# Patient Record
Sex: MALE | Race: WHITE | NOT HISPANIC OR LATINO | URBAN - METROPOLITAN AREA
[De-identification: names, ages, dates, MRNs, and addresses within clinical notes are randomized per-mention and may not be internally consistent; named-entity substitution may affect disease eponyms.]

---

## 2017-11-03 ENCOUNTER — OUTPATIENT (OUTPATIENT)
Dept: OUTPATIENT SERVICES | Facility: HOSPITAL | Age: 72
LOS: 1 days | Discharge: HOME | End: 2017-11-03

## 2017-11-03 DIAGNOSIS — E11.9 TYPE 2 DIABETES MELLITUS WITHOUT COMPLICATIONS: ICD-10-CM

## 2017-11-03 DIAGNOSIS — E55.9 VITAMIN D DEFICIENCY, UNSPECIFIED: ICD-10-CM

## 2017-11-03 DIAGNOSIS — D64.9 ANEMIA, UNSPECIFIED: ICD-10-CM

## 2017-11-03 DIAGNOSIS — N39.0 URINARY TRACT INFECTION, SITE NOT SPECIFIED: ICD-10-CM

## 2017-11-03 DIAGNOSIS — N18.2 CHRONIC KIDNEY DISEASE, STAGE 2 (MILD): ICD-10-CM

## 2017-11-03 DIAGNOSIS — E78.00 PURE HYPERCHOLESTEROLEMIA, UNSPECIFIED: ICD-10-CM

## 2018-06-19 ENCOUNTER — OUTPATIENT (OUTPATIENT)
Dept: OUTPATIENT SERVICES | Facility: HOSPITAL | Age: 73
LOS: 1 days | Discharge: HOME | End: 2018-06-19

## 2018-06-19 DIAGNOSIS — N39.0 URINARY TRACT INFECTION, SITE NOT SPECIFIED: ICD-10-CM

## 2018-06-20 ENCOUNTER — OUTPATIENT (OUTPATIENT)
Dept: OUTPATIENT SERVICES | Facility: HOSPITAL | Age: 73
LOS: 1 days | Discharge: HOME | End: 2018-06-20

## 2018-06-20 DIAGNOSIS — E11.9 TYPE 2 DIABETES MELLITUS WITHOUT COMPLICATIONS: ICD-10-CM

## 2018-06-20 DIAGNOSIS — D64.9 ANEMIA, UNSPECIFIED: ICD-10-CM

## 2018-06-20 DIAGNOSIS — N40.0 BENIGN PROSTATIC HYPERPLASIA WITHOUT LOWER URINARY TRACT SYMPTOMS: ICD-10-CM

## 2018-06-20 DIAGNOSIS — N18.2 CHRONIC KIDNEY DISEASE, STAGE 2 (MILD): ICD-10-CM

## 2019-01-11 ENCOUNTER — OUTPATIENT (OUTPATIENT)
Dept: OUTPATIENT SERVICES | Facility: HOSPITAL | Age: 74
LOS: 1 days | Discharge: HOME | End: 2019-01-11

## 2019-01-11 ENCOUNTER — TRANSCRIPTION ENCOUNTER (OUTPATIENT)
Age: 74
End: 2019-01-11

## 2019-01-11 ENCOUNTER — LABORATORY RESULT (OUTPATIENT)
Age: 74
End: 2019-01-11

## 2019-01-11 ENCOUNTER — APPOINTMENT (OUTPATIENT)
Dept: PLASTIC SURGERY | Facility: CLINIC | Age: 74
End: 2019-01-11
Payer: MEDICARE

## 2019-01-11 DIAGNOSIS — Z86.79 PERSONAL HISTORY OF OTHER DISEASES OF THE CIRCULATORY SYSTEM: ICD-10-CM

## 2019-01-11 DIAGNOSIS — I82.401 ACUTE EMBOLISM AND THROMBOSIS OF UNSPECIFIED DEEP VEINS OF RIGHT LOWER EXTREMITY: ICD-10-CM

## 2019-01-11 DIAGNOSIS — L98.9 DISORDER OF THE SKIN AND SUBCUTANEOUS TISSUE, UNSPECIFIED: ICD-10-CM

## 2019-01-11 DIAGNOSIS — Z86.39 PERSONAL HISTORY OF OTHER ENDOCRINE, NUTRITIONAL AND METABOLIC DISEASE: ICD-10-CM

## 2019-01-11 DIAGNOSIS — Z87.891 PERSONAL HISTORY OF NICOTINE DEPENDENCE: ICD-10-CM

## 2019-01-11 PROCEDURE — 11102 TANGNTL BX SKIN SINGLE LES: CPT

## 2019-01-11 RX ORDER — ATORVASTATIN CALCIUM 40 MG/1
40 TABLET, FILM COATED ORAL
Refills: 0 | Status: ACTIVE | COMMUNITY

## 2019-01-11 RX ORDER — LOSARTAN POTASSIUM 100 MG/1
TABLET, FILM COATED ORAL
Refills: 0 | Status: ACTIVE | COMMUNITY

## 2019-01-14 DIAGNOSIS — L98.9 DISORDER OF THE SKIN AND SUBCUTANEOUS TISSUE, UNSPECIFIED: ICD-10-CM

## 2019-01-24 ENCOUNTER — APPOINTMENT (OUTPATIENT)
Dept: PLASTIC SURGERY | Facility: CLINIC | Age: 74
End: 2019-01-24
Payer: MEDICARE

## 2019-01-24 PROCEDURE — 99213 OFFICE O/P EST LOW 20 MIN: CPT

## 2019-01-24 NOTE — HISTORY OF PRESENT ILLNESS
[FreeTextEntry1] : 74 yo M with PMH of HTN, HLD, DVT in the past treated with Coumadin who presents with left ear pigmented lesion. Patient states the lesion has been present for few years but it is increasing in size and changing in pigmentation. Denies any personal or family history of skin cancer. \par \par Interval history (1/24/19). Patient presents today for follow up 2 weeks s/p excision of left ear conchal bawl skin lesion. Denies any significant pain, fever, chills or bleeding. Applying daily Aquaphor.\par

## 2019-01-24 NOTE — HISTORY OF PRESENT ILLNESS
[FreeTextEntry1] : 74 yo M with PMH of HTN, HLD, DVT in the past treated with Coumadin who presents with left ear pigmented lesion. Patient states the lesion has been present for few years but it is increasing in size and changing in pigmentation. Denies any personal or family history of skin cancer. \par \par Former smoker

## 2019-01-24 NOTE — PHYSICAL EXAM
[de-identified] : well-developed male, no apparent distress [de-identified] : NC/AT [de-identified] : PERRL [de-identified] : left ear with darkly pigmented skin lesion over conchal bawl measuring 5 x 6mm, heterogenous pigmentation in the center, irregular borders  [de-identified] : MARITZAR [de-identified] : soft, non-tender

## 2019-01-24 NOTE — ASSESSMENT
[FreeTextEntry1] : 72 yo M with left ear pigmented skin lesion r/o melanocytic nevus. \par \par - recommend shave biopsy to establish diagnosis

## 2019-01-24 NOTE — ASSESSMENT
[FreeTextEntry1] : 72 yo M 2 weeks s/p excision of left ear skin lesion. 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 \par \par - continue daily Aquaphor\par - pathology discussed with patient- will need re-excision for margins\par - schedule office procedure\par - recommend dermatology consultation for whole body evaluation \par - f/u for office procedure in 4-6 weeks. \par \par Patient was seen and examined with Dr. Aguillon.

## 2019-02-21 ENCOUNTER — OUTPATIENT (OUTPATIENT)
Dept: OUTPATIENT SERVICES | Facility: HOSPITAL | Age: 74
LOS: 1 days | Discharge: HOME | End: 2019-02-21

## 2019-02-21 ENCOUNTER — APPOINTMENT (OUTPATIENT)
Dept: PLASTIC SURGERY | Facility: CLINIC | Age: 74
End: 2019-02-21
Payer: MEDICARE

## 2019-02-21 ENCOUNTER — LABORATORY RESULT (OUTPATIENT)
Age: 74
End: 2019-02-21

## 2019-02-21 PROCEDURE — 11442 EXC FACE-MM B9+MARG 1.1-2 CM: CPT

## 2019-02-21 PROCEDURE — 13151 CMPLX RPR E/N/E/L 1.1-2.5 CM: CPT | Mod: 59

## 2019-02-21 NOTE — PROCEDURE
[FreeTextEntry6] : Patient is a 73 year old male with a left ear lesion measuring approximately 1.5 x 1.5  cm.  \par \par The area was prepped and draped in the usual fashion.  Local anesthetic was administered using 1% lidocaine with epinephrine.\par \par Lesion was sharply excised.  Area was irrigated copiously.  Complex wound closure was performed in layers.  The wound measured approximately 2.5  cm.\par \par Sterile dressing applied.  \par \par Patient tolerated procedure well and understands post-op instructions.\par \par Sutures Used:  4-0 nylon\par \par \par \par \par \par

## 2019-02-22 DIAGNOSIS — D03.9 MELANOMA IN SITU, UNSPECIFIED: ICD-10-CM

## 2019-02-25 ENCOUNTER — APPOINTMENT (OUTPATIENT)
Dept: PLASTIC SURGERY | Facility: CLINIC | Age: 74
End: 2019-02-25
Payer: MEDICARE

## 2019-02-25 PROCEDURE — 99212 OFFICE O/P EST SF 10 MIN: CPT

## 2019-02-25 NOTE — PHYSICAL EXAM
[de-identified] : well-appearing male, NAD [de-identified] : left ear conchal bawl with an open wound, punctate bleeding noted, clean, no evidence of cellulitis

## 2019-02-25 NOTE — HISTORY OF PRESENT ILLNESS
[FreeTextEntry1] : 72 yo M with PMH of HTN, HLD, DVT in the past treated with Coumadin who presents with left ear pigmented lesion. Patient states the lesion has been present for few years but it is increasing in size and changing in pigmentation. Denies any personal or family history of skin cancer. \par \par Interval history (1/24/19). Patient presents today for follow up 2 weeks s/p excision of left ear conchal bawl skin lesion. Denies any significant pain, fever, chills or bleeding. Applying daily Aquaphor.\par \par Interval history (2/25/19). Patient presents POD#4 s/p re-excision of left ear melanoma in-situ c/o slight incisional discomfort and oozing from the site 1-2 days after procedure, now resolved. Denies any fever or chills.

## 2019-02-25 NOTE — ASSESSMENT
[FreeTextEntry1] : 74 yo M POD#4 s/p re-excision of left ear melanoma in-situ. Pathology pending. \par \par - Xeroform bolster dressing removed\par - Xeroform/Bacitracin dressing applied to stay in place x 3 days then daily Bacitracin \par - awaiting pathology \par - f/u 2 weeks

## 2019-02-28 ENCOUNTER — OUTPATIENT (OUTPATIENT)
Dept: OUTPATIENT SERVICES | Facility: HOSPITAL | Age: 74
LOS: 1 days | Discharge: HOME | End: 2019-02-28

## 2019-02-28 DIAGNOSIS — E11.9 TYPE 2 DIABETES MELLITUS WITHOUT COMPLICATIONS: ICD-10-CM

## 2019-02-28 DIAGNOSIS — N18.2 CHRONIC KIDNEY DISEASE, STAGE 2 (MILD): ICD-10-CM

## 2019-02-28 DIAGNOSIS — E78.00 PURE HYPERCHOLESTEROLEMIA, UNSPECIFIED: ICD-10-CM

## 2019-02-28 DIAGNOSIS — D64.9 ANEMIA, UNSPECIFIED: ICD-10-CM

## 2019-02-28 DIAGNOSIS — N39.0 URINARY TRACT INFECTION, SITE NOT SPECIFIED: ICD-10-CM

## 2019-02-28 DIAGNOSIS — E55.9 VITAMIN D DEFICIENCY, UNSPECIFIED: ICD-10-CM

## 2019-02-28 DIAGNOSIS — K76.89 OTHER SPECIFIED DISEASES OF LIVER: ICD-10-CM

## 2019-03-04 ENCOUNTER — TRANSCRIPTION ENCOUNTER (OUTPATIENT)
Age: 74
End: 2019-03-04

## 2019-03-07 ENCOUNTER — APPOINTMENT (OUTPATIENT)
Dept: PLASTIC SURGERY | Facility: CLINIC | Age: 74
End: 2019-03-07
Payer: MEDICARE

## 2019-03-07 DIAGNOSIS — D03.9 MELANOMA IN SITU, UNSPECIFIED: ICD-10-CM

## 2019-03-07 PROCEDURE — 99212 OFFICE O/P EST SF 10 MIN: CPT

## 2019-03-07 NOTE — HISTORY OF PRESENT ILLNESS
[FreeTextEntry1] : 74 yo M with PMH of HTN, HLD, DVT in the past treated with Coumadin who presents with left ear pigmented lesion. Patient states the lesion has been present for few years but it is increasing in size and changing in pigmentation. Denies any personal or family history of skin cancer. \par \par Interval history (1/24/19). Patient presents today for follow up 2 weeks s/p excision of left ear conchal bawl skin lesion. Denies any significant pain, fever, chills or bleeding. Applying daily Aquaphor.\par \par Interval history (2/25/19). Patient presents POD#4 s/p re-excision of left ear melanoma in-situ c/o slight incisional discomfort and oozing from the site 1-2 days after procedure, now resolved. Denies any fever or chills. \par \par Interval history (3/7/19). Patient presents today 2 weeks s/p re-excision of left ear melanoma in-situ. Doing well and reporting no significant pain, bleeding, fever or chills. Applying daily Bacitracin. Saw Dr. Sanchez - no other suspicious skin lesions found.

## 2019-03-07 NOTE — PHYSICAL EXAM
[de-identified] : well-appearing male, NAD [de-identified] : left ear open wound over conchal bawl healing nicely, clean, no active bleeding, wound juan c, no evidence of cellulitis

## 2019-03-07 NOTE — DATA REVIEWED
[FreeTextEntry1] :  Biopsy             Final\par \par   TRAMONTANA, FILEMON                 2\par \par \par               Surgical Final Report\par \par \par           Final Diagnosis\par           Left ear lesion:\par           - Ulcer with scar and focal chronic inflammation, consistent with\par           prior biopsy site.\par           - No evidence of residual melanoma is identified. See comment.\par \par           Verified by: Meghna Vasques MD\par           (Electronic Signature)\par           Reported on: 02/27/19 11:44 EST, [facility] [address] [address], [address],\[place]           [place] [address]\par           Histology technical processing performed at [address],\[place]           [place] [address]\par           _________________________________________________________________\par \par           Comment\par           Immunostains for MART1 and HMB45 were performed on sections from\par           block 1C and 1D and show no evidence of residual melanoma\par           maligna.\par \par           Clinical History\par           Re-excision left ear lesion stitch marks 12 oclock\par \par           Specimen(s) Submitted\par           Left ear lesion\par \par           Gross Description\par           The specimen is received in formalin, and labeled "re-excision\par           left ear lesion stitch marks 12 oclock" and consists of an\par           oriented round skin with a black suture marking the 12oclock\par           position, measuring 1.6 x 1.5 x 0.5 cm. There is a round ulcer on\par           the skin measuring 0.3 x 0.3 cm and is 0.7 cm from 12 oclock,\par           0.5 cm from 6 oclock, 0.7 cm from 3 oclock and 0.6 cm from 9\par           oclock. The 12-6 peripheral and deep margins are inked blue. The\par           6-12 peripheral and deep margins are inked black. The tips are\par           [id number] and serially sectioned. Serial sectioning of the\par           specimen reveals white firm surface. The specimen is submitted\par           entirely.\par \par           Summary of Sections:\par           1A -12 oclock tip serial sections -1\par           1B - 6 oclock tip serial sctions -1\par           1C-1D - Serial sections from 12-6 oclock -2\par \par           Total blocks - 4\par \par           Specimen was received and underwent gross examination at [facility], [address] [place] [address],\par           [place] [address].\par \par \par \par \par \par           TRAMONTANA, FILEMON                 2\par \par \par \par                  Surgical Final Report\par \par \par \par \par \par           02/22/19 13:47 aa\par \par           Perioperative Diagnosis\par           D03.9-Melanoma in situ, unspecified\par \par  \par \par  Ordered by: HOA HERNADEZ IV       Collected/Examined: [id number] 11:16AM       \par Verification Required       Stage: Final       \par  Performed at: [facility] (Med Director: Jake Abbott M.D)       Resulted: [id number] 11:44AM       Last Updated: [id number] 01:37PM       Accession: [id number]

## 2019-03-07 NOTE — ASSESSMENT
[FreeTextEntry1] : 74 yo M 2 weeks s/p re-excision of left ear melanoma in-situ. Pathology reveals no residual neoplasm. Doing well. \par \par - continue daily Bacitracin x 3 days then daily Aquaphor\par - pathology discussed with patient and his wife\par - patient reassured\par - f/u 6 months with Dr. Aguillon

## 2019-09-10 ENCOUNTER — APPOINTMENT (OUTPATIENT)
Dept: PLASTIC SURGERY | Facility: CLINIC | Age: 74
End: 2019-09-10

## 2020-08-25 ENCOUNTER — APPOINTMENT (OUTPATIENT)
Dept: UROLOGY | Facility: CLINIC | Age: 75
End: 2020-08-25
Payer: MEDICARE

## 2020-08-25 ENCOUNTER — RX RENEWAL (OUTPATIENT)
Age: 75
End: 2020-08-25

## 2020-08-25 PROCEDURE — 99204 OFFICE O/P NEW MOD 45 MIN: CPT

## 2020-08-25 PROCEDURE — 51798 US URINE CAPACITY MEASURE: CPT

## 2020-09-11 ENCOUNTER — OUTPATIENT (OUTPATIENT)
Dept: OUTPATIENT SERVICES | Facility: HOSPITAL | Age: 75
LOS: 1 days | Discharge: HOME | End: 2020-09-11
Payer: MEDICARE

## 2020-09-11 DIAGNOSIS — N40.1 BENIGN PROSTATIC HYPERPLASIA WITH LOWER URINARY TRACT SYMPTOMS: ICD-10-CM

## 2020-09-11 PROCEDURE — 76770 US EXAM ABDO BACK WALL COMP: CPT | Mod: 26

## 2020-10-07 ENCOUNTER — APPOINTMENT (OUTPATIENT)
Dept: UROLOGY | Facility: CLINIC | Age: 75
End: 2020-10-07
Payer: MEDICARE

## 2020-10-07 VITALS — HEIGHT: 69 IN | WEIGHT: 174 LBS | BODY MASS INDEX: 25.77 KG/M2

## 2020-10-07 VITALS — TEMPERATURE: 97.4 F

## 2020-10-07 PROCEDURE — 99213 OFFICE O/P EST LOW 20 MIN: CPT

## 2020-10-07 PROCEDURE — 51798 US URINE CAPACITY MEASURE: CPT

## 2020-10-14 ENCOUNTER — APPOINTMENT (OUTPATIENT)
Dept: OPHTHALMOLOGY | Facility: CLINIC | Age: 75
End: 2020-10-14
Payer: MEDICARE

## 2020-10-14 ENCOUNTER — NON-APPOINTMENT (OUTPATIENT)
Age: 75
End: 2020-10-14

## 2020-10-14 PROCEDURE — 92250 FUNDUS PHOTOGRAPHY W/I&R: CPT

## 2020-10-14 PROCEDURE — 92004 COMPRE OPH EXAM NEW PT 1/>: CPT

## 2020-11-23 ENCOUNTER — RESULT REVIEW (OUTPATIENT)
Age: 75
End: 2020-11-23

## 2020-11-23 ENCOUNTER — OUTPATIENT (OUTPATIENT)
Dept: OUTPATIENT SERVICES | Facility: HOSPITAL | Age: 75
LOS: 1 days | Discharge: HOME | End: 2020-11-23
Payer: MEDICARE

## 2020-11-23 DIAGNOSIS — N40.1 BENIGN PROSTATIC HYPERPLASIA WITH LOWER URINARY TRACT SYMPTOMS: ICD-10-CM

## 2020-11-23 PROCEDURE — 74178 CT ABD&PLV WO CNTR FLWD CNTR: CPT | Mod: 26

## 2021-02-16 ENCOUNTER — RX RENEWAL (OUTPATIENT)
Age: 76
End: 2021-02-16

## 2021-05-07 ENCOUNTER — APPOINTMENT (OUTPATIENT)
Dept: UROLOGY | Facility: CLINIC | Age: 76
End: 2021-05-07
Payer: MEDICARE

## 2021-05-07 PROCEDURE — 99447 NTRPROF PH1/NTRNET/EHR 11-20: CPT

## 2021-05-07 NOTE — PHYSICAL EXAM
[General Appearance - Well Developed] : well developed [General Appearance - Well Nourished] : well nourished [Normal Appearance] : normal appearance [Well Groomed] : well groomed [General Appearance - In No Acute Distress] : no acute distress [Abdomen Soft] : soft [Costovertebral Angle Tenderness] : no ~M costovertebral angle tenderness [Abdomen Tenderness] : non-tender [Urethral Meatus] : meatus normal [Penis Abnormality] : normal uncircumcised penis [Urinary Bladder Findings] : the bladder was normal on palpation [Scrotum] : the scrotum was normal [Testes Tenderness] : no tenderness of the testes [Prostate Tenderness] : the prostate was not tender [Testes Mass (___cm)] : there were no testicular masses [No Prostate Nodules] : no prostate nodules [Prostate Size ___ gm] : prostate size [unfilled] gm [Skin Color & Pigmentation] : normal skin color and pigmentation [Edema] : no peripheral edema [] : no respiratory distress [Respiration, Rhythm And Depth] : normal respiratory rhythm and effort [Exaggerated Use Of Accessory Muscles For Inspiration] : no accessory muscle use [Oriented To Time, Place, And Person] : oriented to person, place, and time [Affect] : the affect was normal [Mood] : the mood was normal [Not Anxious] : not anxious [Normal Station and Gait] : the gait and station were normal for the patient's age [No Focal Deficits] : no focal deficits [No Palpable Adenopathy] : no palpable adenopathy [FreeTextEntry1] : sulcus ++ (from initial visit)

## 2021-05-07 NOTE — END OF VISIT
[FreeTextEntry3] : \par  [Time Spent: ___ minutes] : I have spent [unfilled] minutes of time on the encounter.

## 2021-05-07 NOTE — ASSESSMENT
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 right kidney stone, \par #3. right renal bosniak cyst 2 \par #4. Bilateral Adrenal Adenomas , small \par \par Plan\par -continue Uroxatral\par -PSA -nov. 2021\par -rto nov .2021

## 2021-05-07 NOTE — HISTORY OF PRESENT ILLNESS
[Home] : at home, [unfilled] , at the time of the visit. [Medical Office: ([facility])___] : at the medical office located in  [Verbal consent obtained from patient] : the patient, [unfilled] [Urinary Frequency] : urinary frequency [Nocturia] : nocturia [None] : None [FreeTextEntry1] : 75-year-old male here for f/u regarding lower urinary tract symptoms . He reports being a previous patient of [facility] and having gone under multiple procedures and finally being placed on Flomax sometime ago . \par  switched  from flomax to uroxatrol for a PVR of 200cc\par pt reports an improvement in symptoms with new med .\par \par Reports PCP performs PSA\par 8/20 bladder scan= 210 cc [ voided one hour ago]\par 10/20 bladder scan= 16ml\par renal /bladder u/s- 7mm right upper pole stone, right renal cyst\par \par 11/20 CT scan with IV= bilateral adrenal adenomas, small\par                          = Bilateral renal cysts / left low pole Bosniak 2\par                          = 2 mm right renal calculus\par 11/20 PSA = .8 [Urinary Urgency] : no urinary urgency [Straining] : no straining [Weak Stream] : no weak stream [Post-Void Dribbling] : no post-void dribbling [Dysuria] : no dysuria [Hematuria - Gross] : no gross hematuria [Fever] : no fever

## 2021-09-05 ENCOUNTER — TRANSCRIPTION ENCOUNTER (OUTPATIENT)
Age: 76
End: 2021-09-05

## 2021-11-26 DIAGNOSIS — Z00.00 ENCOUNTER FOR GENERAL ADULT MEDICAL EXAMINATION W/OUT ABNORMAL FINDINGS: ICD-10-CM

## 2021-12-01 ENCOUNTER — APPOINTMENT (OUTPATIENT)
Dept: OPHTHALMOLOGY | Facility: CLINIC | Age: 76
End: 2021-12-01
Payer: MEDICARE

## 2021-12-01 ENCOUNTER — NON-APPOINTMENT (OUTPATIENT)
Age: 76
End: 2021-12-01

## 2021-12-01 PROCEDURE — 92012 INTRM OPH EXAM EST PATIENT: CPT

## 2021-12-01 PROCEDURE — 92250 FUNDUS PHOTOGRAPHY W/I&R: CPT

## 2022-01-27 ENCOUNTER — APPOINTMENT (OUTPATIENT)
Dept: UROLOGY | Facility: CLINIC | Age: 77
End: 2022-01-27
Payer: MEDICARE

## 2022-01-27 VITALS — WEIGHT: 171 LBS | HEIGHT: 69 IN | TEMPERATURE: 97.2 F | BODY MASS INDEX: 25.33 KG/M2

## 2022-01-27 PROCEDURE — 99213 OFFICE O/P EST LOW 20 MIN: CPT

## 2022-01-27 RX ORDER — ALFUZOSIN HYDROCHLORIDE 10 MG/1
10 TABLET, EXTENDED RELEASE ORAL
Qty: 30 | Refills: 5 | Status: ACTIVE | COMMUNITY
Start: 2022-01-27 | End: 1900-01-01

## 2022-01-27 NOTE — PHYSICAL EXAM
[General Appearance - Well Developed] : well developed [General Appearance - Well Nourished] : well nourished [Normal Appearance] : normal appearance [Well Groomed] : well groomed [General Appearance - In No Acute Distress] : no acute distress [Abdomen Soft] : soft [Abdomen Tenderness] : non-tender [Costovertebral Angle Tenderness] : no ~M costovertebral angle tenderness [Urethral Meatus] : meatus normal [Penis Abnormality] : normal uncircumcised penis [Urinary Bladder Findings] : the bladder was normal on palpation [Scrotum] : the scrotum was normal [Testes Tenderness] : no tenderness of the testes [Testes Mass (___cm)] : there were no testicular masses [Prostate Tenderness] : the prostate was not tender [No Prostate Nodules] : no prostate nodules [Prostate Size ___ gm] : prostate size [unfilled] gm [Skin Color & Pigmentation] : normal skin color and pigmentation [Edema] : no peripheral edema [] : no respiratory distress [Respiration, Rhythm And Depth] : normal respiratory rhythm and effort [Exaggerated Use Of Accessory Muscles For Inspiration] : no accessory muscle use [Oriented To Time, Place, And Person] : oriented to person, place, and time [Affect] : the affect was normal [Mood] : the mood was normal [Not Anxious] : not anxious [Normal Station and Gait] : the gait and station were normal for the patient's age [No Focal Deficits] : no focal deficits [No Palpable Adenopathy] : no palpable adenopathy [FreeTextEntry1] : sulcus ++ (from initial visit)

## 2022-01-27 NOTE — ASSESSMENT
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 right kidney stone, small\par #3. right renal Bosniak cyst 2 \par #4. Bilateral Adrenal Adenomas , small \par \par Plan:\par -Discussed PSA results from PCP\par -Discussed decreasing frequency of Uroxatral\par -Continue and renew Uroxatral 10 MG\par -PSA 06/2022\par -RTO 06/2022\par \par

## 2022-01-27 NOTE — END OF VISIT
[FreeTextEntry3] : Patient notes was transcribed by darrick Gonzales under the supervision of Dr. Bynum.\par And I have   reviewed the patient's chart and agree that it alines  with my medical decisions.\par

## 2022-01-27 NOTE — HISTORY OF PRESENT ILLNESS
[Urinary Frequency] : urinary frequency [None] : None [FreeTextEntry1] : 76 year old male presents to the office for a follow up of lower urinary tract symptoms. He reports being a previous patient of [facility] and having gone under multiple procedures. He reports improvement on Uroxatral, occasionally waking up once a night to urinate. He reports feeling well. He denies any fever, nausea, and other constitutional symptoms.\par \par Reports PCP performs PSA\par \par All past and present data reviewed:\par 8/20 bladder scan= 210 cc [ voided one hour ago]\par 10/20 bladder scan= 16ml\par renal /bladder u/s- 7mm right upper pole stone, right renal cyst\par \par 11/20 CT scan with IV= bilateral adrenal adenomas, small\par                          = Bilateral renal cysts / left low pole Bosniak 2\par                          = 2 mm right renal calculus\par 11/20 PSA= 0.8\par 06/21 PSA= 1.0 [Urinary Urgency] : no urinary urgency [Nocturia] : no nocturia [Straining] : no straining [Weak Stream] : no weak stream [Post-Void Dribbling] : no post-void dribbling [Dysuria] : no dysuria [Hematuria - Gross] : no gross hematuria [Fever] : no fever

## 2022-03-24 ENCOUNTER — APPOINTMENT (OUTPATIENT)
Dept: PLASTIC SURGERY | Facility: CLINIC | Age: 77
End: 2022-03-24
Payer: MEDICARE

## 2022-03-24 ENCOUNTER — LABORATORY RESULT (OUTPATIENT)
Age: 77
End: 2022-03-24

## 2022-03-24 DIAGNOSIS — D48.5 NEOPLASM OF UNCERTAIN BEHAVIOR OF SKIN: ICD-10-CM

## 2022-03-24 PROCEDURE — 11104 PUNCH BX SKIN SINGLE LESION: CPT

## 2022-03-24 NOTE — HISTORY OF PRESENT ILLNESS
[FreeTextEntry1] : 74 yo M with PMH of HTN, HLD, DVT in the past treated with Coumadin who presents with left ear pigmented lesion. Patient states the lesion has been present for few years but it is increasing in size and changing in pigmentation. Denies any personal or family history of skin cancer. \par \par Interval history (1/24/19). Patient presents today for follow up 2 weeks s/p excision of left ear conchal bawl skin lesion. Denies any significant pain, fever, chills or bleeding. Applying daily Aquaphor.\par \par Interval history (2/25/19). Patient presents POD#4 s/p re-excision of left ear melanoma in-situ c/o slight incisional discomfort and oozing from the site 1-2 days after procedure, now resolved. Denies any fever or chills. \par \par Interval history (3/7/19). Patient presents today 2 weeks s/p re-excision of left ear melanoma in-situ. Doing well and reporting no significant pain, bleeding, fever or chills. Applying daily Bacitracin. Saw Dr. Sanchez - no other suspicious skin lesions found. \par \par Interval hx (3/24/22). Patient presents today for f/u. Left ear healed with no complaints. Patient is very happy. Sees Dr. Sanchez for dermatologic surveillance. Presents today c/o development of new right ear skin lesion for the past few months increasing in size.

## 2022-03-24 NOTE — ASSESSMENT
[FreeTextEntry1] : 77 yo M s/p re-excision of left ear melanoma in-situ 2019. Pathology reveals no residual neoplasm. Doing well. \par Now with new right ear skin lesion. \par \par - punch biopsy performed today to establish diagnosis, pt tolerated well \par - daily Bacitracin x 3 days then daily Aquaphor\par - wound care instructions discussed and supplies given \par - f/u 2 weeks to discuss pathology and treatment plan

## 2022-03-24 NOTE — PHYSICAL EXAM
[de-identified] : well-appearing male, NAD [de-identified] : right ear with macular pigmented irregular skin lesion 6x4mm superior ear, \par left ear conchal bawl wound healed, no evidence of recurrence or HTS

## 2022-03-29 ENCOUNTER — NON-APPOINTMENT (OUTPATIENT)
Age: 77
End: 2022-03-29

## 2022-03-29 ENCOUNTER — APPOINTMENT (OUTPATIENT)
Dept: OPHTHALMOLOGY | Facility: CLINIC | Age: 77
End: 2022-03-29
Payer: MEDICARE

## 2022-03-29 PROCEDURE — 66821 AFTER CATARACT LASER SURGERY: CPT | Mod: RT

## 2022-06-14 ENCOUNTER — APPOINTMENT (OUTPATIENT)
Dept: UROLOGY | Facility: CLINIC | Age: 77
End: 2022-06-14
Payer: MEDICARE

## 2022-06-14 VITALS — HEIGHT: 69 IN | BODY MASS INDEX: 25.18 KG/M2 | WEIGHT: 170 LBS

## 2022-06-14 DIAGNOSIS — N20.0 CALCULUS OF KIDNEY: ICD-10-CM

## 2022-06-14 LAB
BILIRUB UR QL STRIP: NORMAL
COLLECTION METHOD: NORMAL
GLUCOSE UR-MCNC: NORMAL
HCG UR QL: 0.2 EU/DL
HGB UR QL STRIP.AUTO: NORMAL
KETONES UR-MCNC: NORMAL
LEUKOCYTE ESTERASE UR QL STRIP: NORMAL
NITRITE UR QL STRIP: NORMAL
PH UR STRIP: 5.5
PROT UR STRIP-MCNC: NORMAL
SP GR UR STRIP: 1.03

## 2022-06-14 PROCEDURE — 81003 URINALYSIS AUTO W/O SCOPE: CPT | Mod: QW

## 2022-06-14 PROCEDURE — 99214 OFFICE O/P EST MOD 30 MIN: CPT

## 2022-06-14 NOTE — HISTORY OF PRESENT ILLNESS
[Urinary Frequency] : urinary frequency [None] : None [FreeTextEntry1] : 76 year old male presents to the office for a follow up of lower urinary tract symptoms. He reports being a previous patient of [facility] and having gone under multiple procedures. He reports improvement on Uroxatral, therefore we reduced his Uroxatrol to every other day.  He noticed a decrease in his urinary stream with an increase in overall voiding symptoms and therefore reinstituted daily use.  He reports feeling well. He denies any fever, nausea, and other constitutional symptoms.\par \par Reports PCP performs PSA\par \par All past and present data reviewed:\par 8/20 bladder scan= 210 cc [ voided one hour ago]\par 10/20 bladder scan= 16ml\par renal /bladder u/s- 7mm right upper pole stone, right renal cyst\par \par 11/20 CT scan with IV= bilateral adrenal adenomas, small\par                          = Bilateral renal cysts / left low pole Bosniak 2\par                          = 2 mm right renal calculus\par 11/20 PSA= 0.8\par 06/21 PSA= 1.0\par 6/2022 psa = 1.2//urine analysis= negative [Urinary Urgency] : no urinary urgency [Nocturia] : no nocturia [Straining] : no straining [Weak Stream] : no weak stream [Post-Void Dribbling] : no post-void dribbling [Dysuria] : no dysuria [Hematuria - Gross] : no gross hematuria [Fever] : no fever

## 2022-06-14 NOTE — ASSESSMENT
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 right kidney stone, small\par #3. right renal Bosniak cyst 2 \par #4. Bilateral Adrenal Adenomas , small \par \par Plan:\par -Discussed PSA results from [facility]\par -Continue  Uroxatral 10 MG p.o. daily\par -PSA 06/2023\par -RTO 06/2023\par -KUB  6/2023 \par \par

## 2022-07-06 ENCOUNTER — NON-APPOINTMENT (OUTPATIENT)
Age: 77
End: 2022-07-06

## 2022-07-06 ENCOUNTER — APPOINTMENT (OUTPATIENT)
Dept: OPHTHALMOLOGY | Facility: CLINIC | Age: 77
End: 2022-07-06

## 2022-07-06 PROCEDURE — 92012 INTRM OPH EXAM EST PATIENT: CPT

## 2022-07-27 ENCOUNTER — OUTPATIENT (OUTPATIENT)
Dept: OUTPATIENT SERVICES | Facility: HOSPITAL | Age: 77
LOS: 1 days | Discharge: HOME | End: 2022-07-27

## 2022-07-27 DIAGNOSIS — N20.0 CALCULUS OF KIDNEY: ICD-10-CM

## 2022-07-27 PROCEDURE — 74019 RADEX ABDOMEN 2 VIEWS: CPT | Mod: 26

## 2022-12-16 RX ORDER — CHLORHEXIDINE GLUCONATE, 0.12% ORAL RINSE 1.2 MG/ML
0.12 SOLUTION DENTAL
Qty: 1 | Refills: 1 | Status: ACTIVE | COMMUNITY
Start: 2021-11-26 | End: 1900-01-01

## 2023-06-14 ENCOUNTER — APPOINTMENT (OUTPATIENT)
Dept: UROLOGY | Facility: CLINIC | Age: 78
End: 2023-06-14
Payer: MEDICARE

## 2023-06-14 VITALS
BODY MASS INDEX: 25.18 KG/M2 | TEMPERATURE: 98 F | DIASTOLIC BLOOD PRESSURE: 89 MMHG | SYSTOLIC BLOOD PRESSURE: 130 MMHG | HEIGHT: 69 IN | WEIGHT: 170 LBS

## 2023-06-14 DIAGNOSIS — Z00.00 ENCOUNTER FOR GENERAL ADULT MEDICAL EXAMINATION W/OUT ABNORMAL FINDINGS: ICD-10-CM

## 2023-06-14 LAB
BILIRUB UR QL STRIP: NORMAL
COLLECTION METHOD: NORMAL
GLUCOSE UR-MCNC: NORMAL
HCG UR QL: 0.2 EU/DL
HGB UR QL STRIP.AUTO: NORMAL
KETONES UR-MCNC: NORMAL
LEUKOCYTE ESTERASE UR QL STRIP: NORMAL
NITRITE UR QL STRIP: NORMAL
PH UR STRIP: 5
PROT UR STRIP-MCNC: NORMAL
SP GR UR STRIP: 1.03

## 2023-06-14 PROCEDURE — 81003 URINALYSIS AUTO W/O SCOPE: CPT | Mod: QW

## 2023-06-14 PROCEDURE — 99214 OFFICE O/P EST MOD 30 MIN: CPT

## 2023-06-21 ENCOUNTER — RX RENEWAL (OUTPATIENT)
Age: 78
End: 2023-06-21

## 2023-06-21 RX ORDER — ALFUZOSIN HYDROCHLORIDE 10 MG/1
10 TABLET, EXTENDED RELEASE ORAL
Qty: 90 | Refills: 0 | Status: ACTIVE | COMMUNITY
Start: 2022-06-14 | End: 1900-01-01

## 2023-07-08 ENCOUNTER — NON-APPOINTMENT (OUTPATIENT)
Age: 78
End: 2023-07-08

## 2023-07-12 ENCOUNTER — NON-APPOINTMENT (OUTPATIENT)
Age: 78
End: 2023-07-12

## 2023-07-12 ENCOUNTER — APPOINTMENT (OUTPATIENT)
Dept: OPHTHALMOLOGY | Facility: CLINIC | Age: 78
End: 2023-07-12
Payer: MEDICARE

## 2023-07-12 PROCEDURE — 92025 CPTRIZED CORNEAL TOPOGRAPHY: CPT

## 2023-07-12 PROCEDURE — 92286 ANT SGM IMG I&R SPECLR MIC: CPT

## 2023-07-12 PROCEDURE — 92250 FUNDUS PHOTOGRAPHY W/I&R: CPT

## 2023-07-12 PROCEDURE — 92014 COMPRE OPH EXAM EST PT 1/>: CPT

## 2024-01-05 NOTE — PHYSICAL EXAM
[de-identified] : well-appearing male, no distress [de-identified] : N [de-identified] : left ear with healing excision site over the conchal bawl, no evidence of cellulitis  Statement Selected

## 2024-05-08 RX ORDER — ALFUZOSIN HYDROCHLORIDE 10 MG/1
10 TABLET, EXTENDED RELEASE ORAL
Qty: 30 | Refills: 5 | Status: ACTIVE | COMMUNITY
Start: 2020-08-25 | End: 1900-01-01

## 2024-05-28 ENCOUNTER — NON-APPOINTMENT (OUTPATIENT)
Age: 79
End: 2024-05-28

## 2024-05-29 ENCOUNTER — APPOINTMENT (OUTPATIENT)
Dept: OPHTHALMOLOGY | Facility: CLINIC | Age: 79
End: 2024-05-29
Payer: MEDICARE

## 2024-05-29 ENCOUNTER — NON-APPOINTMENT (OUTPATIENT)
Age: 79
End: 2024-05-29

## 2024-05-29 PROCEDURE — 92012 INTRM OPH EXAM EST PATIENT: CPT

## 2024-05-29 PROCEDURE — 92250 FUNDUS PHOTOGRAPHY W/I&R: CPT

## 2024-05-29 PROCEDURE — 92286 ANT SGM IMG I&R SPECLR MIC: CPT

## 2024-06-13 ENCOUNTER — APPOINTMENT (OUTPATIENT)
Dept: UROLOGY | Facility: CLINIC | Age: 79
End: 2024-06-13
Payer: MEDICARE

## 2024-06-13 VITALS — DIASTOLIC BLOOD PRESSURE: 77 MMHG | HEART RATE: 67 BPM | SYSTOLIC BLOOD PRESSURE: 150 MMHG

## 2024-06-13 DIAGNOSIS — N40.1 BENIGN PROSTATIC HYPERPLASIA WITH LOWER URINARY TRACT SYMPMS: ICD-10-CM

## 2024-06-13 DIAGNOSIS — N28.1 CYST OF KIDNEY, ACQUIRED: ICD-10-CM

## 2024-06-13 DIAGNOSIS — N13.8 BENIGN PROSTATIC HYPERPLASIA WITH LOWER URINARY TRACT SYMPMS: ICD-10-CM

## 2024-06-13 DIAGNOSIS — D35.00 BENIGN NEOPLASM OF UNSPECIFIED ADRENAL GLAND: ICD-10-CM

## 2024-06-13 DIAGNOSIS — R35.0 FREQUENCY OF MICTURITION: ICD-10-CM

## 2024-06-13 LAB
BILIRUB UR QL STRIP: NORMAL
COLLECTION METHOD: NORMAL
GLUCOSE UR-MCNC: NORMAL
HCG UR QL: 0.2 EU/DL
HGB UR QL STRIP.AUTO: NORMAL
KETONES UR-MCNC: NORMAL
LEUKOCYTE ESTERASE UR QL STRIP: NORMAL
NITRITE UR QL STRIP: NORMAL
PH UR STRIP: 5.5
PROT UR STRIP-MCNC: NORMAL
SP GR UR STRIP: 1.02

## 2024-06-13 PROCEDURE — 81003 URINALYSIS AUTO W/O SCOPE: CPT | Mod: QW

## 2024-06-13 PROCEDURE — G2211 COMPLEX E/M VISIT ADD ON: CPT

## 2024-06-13 PROCEDURE — 99214 OFFICE O/P EST MOD 30 MIN: CPT

## 2024-06-13 NOTE — END OF VISIT
[Time Spent: ___ minutes] : I have spent [unfilled] minutes of time on the encounter. [FreeTextEntry3] : Pt seen ,evaluated ,examined  by me with PA/ RN present and agree to findings , plan

## 2024-06-13 NOTE — PHYSICAL EXAM
[General Appearance - Well Developed] : well developed [General Appearance - Well Nourished] : well nourished [Normal Appearance] : normal appearance [Well Groomed] : well groomed [General Appearance - In No Acute Distress] : no acute distress [Abdomen Soft] : soft [Abdomen Tenderness] : non-tender [Costovertebral Angle Tenderness] : no ~M costovertebral angle tenderness [Urethral Meatus] : meatus normal [Penis Abnormality] : normal uncircumcised penis [Urinary Bladder Findings] : the bladder was normal on palpation [Scrotum] : the scrotum was normal [Testes Tenderness] : no tenderness of the testes [Testes Mass (___cm)] : there were no testicular masses [Prostate Tenderness] : the prostate was not tender [No Prostate Nodules] : no prostate nodules [Prostate Size ___ gm] : prostate size [unfilled] gm [Skin Color & Pigmentation] : normal skin color and pigmentation [] : no respiratory distress [Respiration, Rhythm And Depth] : normal respiratory rhythm and effort [Exaggerated Use Of Accessory Muscles For Inspiration] : no accessory muscle use [Oriented To Time, Place, And Person] : oriented to person, place, and time [Affect] : the affect was normal [Mood] : the mood was normal [Not Anxious] : not anxious [Normal Station and Gait] : the gait and station were normal for the patient's age [No Focal Deficits] : no focal deficits [FreeTextEntry1] : sulcus ++ (from initial visit)

## 2024-06-13 NOTE — ASSESSMENT
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 right kidney stone, small  - not seen on last KUB #3. right renal Bosniak cyst 2  #4. Bilateral Adrenal Adenomas , small   Plan: -Continue and renew alfuzosin  -PSA annual -RTO 12 months

## 2024-06-13 NOTE — HISTORY OF PRESENT ILLNESS
[Urinary Frequency] : urinary frequency [None] : None [FreeTextEntry1] : 78 year old male with h/o BPH and lower urinary tract symptoms. He is maintained on alfuzosin 10mg   He reports a variable urinary pattern He reports feeling well  He denies dysuria, hematuria, fever, nausea, orother constitutional symptoms.  All past and present data reviewed: 07/2022 KUB= right kidney stones not seen secondary to bowel, left side likely vascular calcifications   06/2023 UA= neg Udip 6/13/24- neg  8/20 bladder scan= 210 cc [ voided one hour ago] 10/20 bladder scan= 16ml renal /bladder u/s- 7mm right upper pole stone, right renal cyst  11/20 CT scan with IV= bilateral adrenal adenomas, small                          = Bilateral renal cysts / left low pole Bosniak 2                          = 2 mm right renal calculus 11/20 PSA= 0.8 06/21 PSA= 1.0 6/2022 psa = 1.2//urine analysis= negative 05/2023 PSA= 1.1 6/2024  PSA-  0.8         Udip 6/13/24- neg [Urinary Urgency] : no urinary urgency [Nocturia] : no nocturia [Straining] : no straining [Weak Stream] : no weak stream [Post-Void Dribbling] : no post-void dribbling [Dysuria] : no dysuria [Hematuria - Gross] : no gross hematuria [Fever] : no fever

## 2024-11-07 ENCOUNTER — RX RENEWAL (OUTPATIENT)
Age: 79
End: 2024-11-07

## 2024-11-09 ENCOUNTER — RX RENEWAL (OUTPATIENT)
Age: 79
End: 2024-11-09

## 2024-12-09 ENCOUNTER — RX RENEWAL (OUTPATIENT)
Age: 79
End: 2024-12-09

## 2024-12-18 ENCOUNTER — NON-APPOINTMENT (OUTPATIENT)
Age: 79
End: 2024-12-18

## 2024-12-19 ENCOUNTER — APPOINTMENT (OUTPATIENT)
Dept: UROLOGY | Facility: CLINIC | Age: 79
End: 2024-12-19
Payer: MEDICARE

## 2024-12-19 ENCOUNTER — RX RENEWAL (OUTPATIENT)
Age: 79
End: 2024-12-19

## 2024-12-19 VITALS
HEIGHT: 69 IN | HEART RATE: 63 BPM | RESPIRATION RATE: 18 BRPM | WEIGHT: 170 LBS | BODY MASS INDEX: 25.18 KG/M2 | SYSTOLIC BLOOD PRESSURE: 162 MMHG | OXYGEN SATURATION: 67 % | DIASTOLIC BLOOD PRESSURE: 104 MMHG

## 2024-12-19 PROCEDURE — 99213 OFFICE O/P EST LOW 20 MIN: CPT

## 2024-12-19 PROCEDURE — G2211 COMPLEX E/M VISIT ADD ON: CPT

## 2024-12-19 PROCEDURE — 51798 US URINE CAPACITY MEASURE: CPT

## 2024-12-19 RX ORDER — SILODOSIN 8 MG/1
8 CAPSULE ORAL
Qty: 90 | Refills: 0 | Status: ACTIVE | COMMUNITY
Start: 2024-12-19 | End: 1900-01-01

## 2025-01-09 ENCOUNTER — APPOINTMENT (OUTPATIENT)
Dept: UROLOGY | Facility: CLINIC | Age: 80
End: 2025-01-09
Payer: MEDICARE

## 2025-01-09 VITALS
HEIGHT: 69 IN | RESPIRATION RATE: 18 BRPM | HEART RATE: 68 BPM | OXYGEN SATURATION: 96 % | SYSTOLIC BLOOD PRESSURE: 170 MMHG | BODY MASS INDEX: 25.33 KG/M2 | WEIGHT: 171 LBS | DIASTOLIC BLOOD PRESSURE: 99 MMHG

## 2025-01-09 PROCEDURE — 81003 URINALYSIS AUTO W/O SCOPE: CPT | Mod: QW

## 2025-01-09 PROCEDURE — 99213 OFFICE O/P EST LOW 20 MIN: CPT

## 2025-01-09 PROCEDURE — 51798 US URINE CAPACITY MEASURE: CPT

## 2025-01-09 PROCEDURE — G2211 COMPLEX E/M VISIT ADD ON: CPT

## 2025-01-10 LAB
BILIRUB UR QL STRIP: NORMAL
COLLECTION METHOD: NORMAL
GLUCOSE UR-MCNC: NORMAL
HCG UR QL: NORMAL EU/DL
HGB UR QL STRIP.AUTO: NORMAL
KETONES UR-MCNC: NORMAL
LEUKOCYTE ESTERASE UR QL STRIP: NORMAL
NITRITE UR QL STRIP: NORMAL
PH UR STRIP: 5.5
PROT UR STRIP-MCNC: NORMAL
SP GR UR STRIP: 1.01

## 2025-02-07 ENCOUNTER — APPOINTMENT (OUTPATIENT)
Dept: UROLOGY | Facility: CLINIC | Age: 80
End: 2025-02-07
Payer: MEDICARE

## 2025-02-07 PROCEDURE — 76872 US TRANSRECTAL: CPT

## 2025-02-14 ENCOUNTER — APPOINTMENT (OUTPATIENT)
Dept: UROLOGY | Facility: CLINIC | Age: 80
End: 2025-02-14
Payer: MEDICARE

## 2025-02-14 DIAGNOSIS — R35.0 FREQUENCY OF MICTURITION: ICD-10-CM

## 2025-02-14 LAB
BILIRUB UR QL STRIP: NORMAL
COLLECTION METHOD: NORMAL
GLUCOSE UR-MCNC: NORMAL
HCG UR QL: 0.2 EU/DL
HGB UR QL STRIP.AUTO: NORMAL
KETONES UR-MCNC: NORMAL
LEUKOCYTE ESTERASE UR QL STRIP: NORMAL
NITRITE UR QL STRIP: NORMAL
PH UR STRIP: 5.5
PROT UR STRIP-MCNC: NORMAL
SP GR UR STRIP: 1.01

## 2025-02-14 PROCEDURE — 51784 ANAL/URINARY MUSCLE STUDY: CPT

## 2025-02-14 PROCEDURE — 51741 ELECTRO-UROFLOWMETRY FIRST: CPT

## 2025-02-14 PROCEDURE — 51797 INTRAABDOMINAL PRESSURE TEST: CPT

## 2025-02-14 PROCEDURE — 81003 URINALYSIS AUTO W/O SCOPE: CPT | Mod: QW

## 2025-02-14 PROCEDURE — 51798 US URINE CAPACITY MEASURE: CPT

## 2025-02-14 PROCEDURE — 51728 CYSTOMETROGRAM W/VP: CPT

## 2025-02-14 RX ORDER — CEPHALEXIN 500 MG/1
500 CAPSULE ORAL
Qty: 6 | Refills: 0 | Status: ACTIVE | COMMUNITY
Start: 2025-02-14 | End: 1900-01-01

## 2025-03-04 ENCOUNTER — APPOINTMENT (OUTPATIENT)
Dept: UROLOGY | Facility: CLINIC | Age: 80
End: 2025-03-04
Payer: MEDICARE

## 2025-03-04 VITALS
HEIGHT: 68 IN | TEMPERATURE: 98.1 F | OXYGEN SATURATION: 95 % | WEIGHT: 170 LBS | BODY MASS INDEX: 25.76 KG/M2 | SYSTOLIC BLOOD PRESSURE: 175 MMHG | HEART RATE: 62 BPM | RESPIRATION RATE: 18 BRPM | DIASTOLIC BLOOD PRESSURE: 91 MMHG

## 2025-03-04 DIAGNOSIS — N13.8 BENIGN PROSTATIC HYPERPLASIA WITH LOWER URINARY TRACT SYMPMS: ICD-10-CM

## 2025-03-04 DIAGNOSIS — R33.9 RETENTION OF URINE, UNSPECIFIED: ICD-10-CM

## 2025-03-04 DIAGNOSIS — D35.00 BENIGN NEOPLASM OF UNSPECIFIED ADRENAL GLAND: ICD-10-CM

## 2025-03-04 DIAGNOSIS — R35.0 FREQUENCY OF MICTURITION: ICD-10-CM

## 2025-03-04 DIAGNOSIS — N40.1 BENIGN PROSTATIC HYPERPLASIA WITH LOWER URINARY TRACT SYMPMS: ICD-10-CM

## 2025-03-04 PROCEDURE — 99215 OFFICE O/P EST HI 40 MIN: CPT

## 2025-03-04 PROCEDURE — G2211 COMPLEX E/M VISIT ADD ON: CPT

## 2025-06-10 ENCOUNTER — RX RENEWAL (OUTPATIENT)
Age: 80
End: 2025-06-10

## 2025-06-13 ENCOUNTER — APPOINTMENT (OUTPATIENT)
Dept: OPHTHALMOLOGY | Facility: CLINIC | Age: 80
End: 2025-06-13

## 2025-06-17 ENCOUNTER — APPOINTMENT (OUTPATIENT)
Dept: UROLOGY | Facility: CLINIC | Age: 80
End: 2025-06-17
Payer: MEDICARE

## 2025-06-17 PROCEDURE — 51798 US URINE CAPACITY MEASURE: CPT

## 2025-06-17 PROCEDURE — G2211 COMPLEX E/M VISIT ADD ON: CPT

## 2025-06-17 PROCEDURE — 99214 OFFICE O/P EST MOD 30 MIN: CPT

## 2025-08-22 ENCOUNTER — APPOINTMENT (OUTPATIENT)
Dept: OPHTHALMOLOGY | Facility: CLINIC | Age: 80
End: 2025-08-22
Payer: MEDICARE

## 2025-08-22 ENCOUNTER — NON-APPOINTMENT (OUTPATIENT)
Age: 80
End: 2025-08-22

## 2025-08-22 PROCEDURE — 92012 INTRM OPH EXAM EST PATIENT: CPT

## 2025-08-22 PROCEDURE — 92286 ANT SGM IMG I&R SPECLR MIC: CPT
